# Patient Record
Sex: FEMALE | Race: WHITE | NOT HISPANIC OR LATINO | URBAN - METROPOLITAN AREA
[De-identification: names, ages, dates, MRNs, and addresses within clinical notes are randomized per-mention and may not be internally consistent; named-entity substitution may affect disease eponyms.]

---

## 2017-01-03 ENCOUNTER — IMPORTED ENCOUNTER (OUTPATIENT)
Dept: URBAN - METROPOLITAN AREA CLINIC 50 | Facility: CLINIC | Age: 62
End: 2017-01-03

## 2017-01-24 ENCOUNTER — IMPORTED ENCOUNTER (OUTPATIENT)
Dept: URBAN - METROPOLITAN AREA CLINIC 50 | Facility: CLINIC | Age: 62
End: 2017-01-24

## 2018-07-31 ENCOUNTER — IMPORTED ENCOUNTER (OUTPATIENT)
Dept: URBAN - METROPOLITAN AREA CLINIC 50 | Facility: CLINIC | Age: 63
End: 2018-07-31

## 2018-08-22 ENCOUNTER — IMPORTED ENCOUNTER (OUTPATIENT)
Dept: URBAN - METROPOLITAN AREA CLINIC 50 | Facility: CLINIC | Age: 63
End: 2018-08-22

## 2018-08-28 ENCOUNTER — IMPORTED ENCOUNTER (OUTPATIENT)
Dept: URBAN - METROPOLITAN AREA CLINIC 50 | Facility: CLINIC | Age: 63
End: 2018-08-28

## 2018-09-05 ENCOUNTER — IMPORTED ENCOUNTER (OUTPATIENT)
Dept: URBAN - METROPOLITAN AREA CLINIC 50 | Facility: CLINIC | Age: 63
End: 2018-09-05

## 2018-09-05 NOTE — PATIENT DISCUSSION
"""S/P IOL OS: Sensar AAB00 22.5 +TM. Continue post operative instructions and drops per schedule.  """

## 2018-09-11 ENCOUNTER — IMPORTED ENCOUNTER (OUTPATIENT)
Dept: URBAN - METROPOLITAN AREA CLINIC 50 | Facility: CLINIC | Age: 63
End: 2018-09-11

## 2018-10-02 ENCOUNTER — IMPORTED ENCOUNTER (OUTPATIENT)
Dept: URBAN - METROPOLITAN AREA CLINIC 50 | Facility: CLINIC | Age: 63
End: 2018-10-02

## 2019-02-19 ENCOUNTER — IMPORTED ENCOUNTER (OUTPATIENT)
Dept: URBAN - METROPOLITAN AREA CLINIC 50 | Facility: CLINIC | Age: 64
End: 2019-02-19

## 2021-04-17 ASSESSMENT — VISUAL ACUITY
OD_SC: 20/20
OD_OTHER: 20/25. 20/30.
OS_OTHER: 20/50. 20/100.
OS_CC: J10
OS_CC: J1+
OS_OTHER: 20/40. 20/50-.
OS_SC: 20/25-
OD_OTHER: 20/25. 20/30.
OS_BAT: 20/40
OD_CC: J1+
OD_CC: J2
OS_CC: J2
OS_SC: 20/80-1
OD_SC: 20/25-1
OS_BAT: 20/50
OS_SC: 20/40-
OD_BAT: 20/25
OD_OTHER: 20/60. 20/80.
OS_CC: 20/60
OS_SC: 20/20
OD_BAT: 20/25
OD_SC: 20/30+2
OD_SC: 20/20
OD_BAT: 20/60
OD_SC: 20/25
OD_SC: 20/25
OS_SC: 20/20
OS_BAT: 20/40
OS_OTHER: 20/40. 20/40.
OD_CC: J10
OS_SC: 20/20-
OD_SC: 20/20

## 2021-04-17 ASSESSMENT — TONOMETRY
OS_IOP_MMHG: 14
OD_IOP_MMHG: 14
OS_IOP_MMHG: 18
OD_IOP_MMHG: 18
OS_IOP_MMHG: 15
OD_IOP_MMHG: 15
OD_IOP_MMHG: 14
OD_IOP_MMHG: 18
OD_IOP_MMHG: 14
OS_IOP_MMHG: 15
OS_IOP_MMHG: 16
OD_IOP_MMHG: 16
OS_IOP_MMHG: 14

## 2021-07-29 NOTE — PATIENT DISCUSSION
"""Informed patient that their capsular opacification is visually significant and meets the minimum ""
done

## 2022-08-19 NOTE — PATIENT DISCUSSION
"""Informed patient that their capsular opacification is not visually significant or does not meet the minimum criteria for capsulotomy.  Recommended attention to PCO symptoms DISPLAY PLAN FREE TEXT DISPLAY PLAN FREE TEXT DISPLAY PLAN FREE TEXT DISPLAY PLAN FREE TEXT DISPLAY PLAN FREE TEXT DISPLAY PLAN FREE TEXT DISPLAY PLAN FREE TEXT DISPLAY PLAN FREE TEXT DISPLAY PLAN FREE TEXT